# Patient Record
(demographics unavailable — no encounter records)

---

## 2025-03-15 NOTE — PROCEDURE
[Cervical Pap Smear] : cervical Pap smear [Liquid Base] : liquid base [Tolerated Well] : the patient tolerated the procedure well [No Complications] : there were no complications [Fibroid Uterus] : fibroid uterus [Transvaginal Ultrasound] : transvaginal ultrasound [FreeTextEntry4] : multiple small myomas  , endo 48 mm, nl ovaries , rto ov simple cyst , iud in place

## 2025-03-15 NOTE — DISCUSSION/SUMMARY
[FreeTextEntry1] : 46 yo p2 annual gyn, iud in , fibroids pap hpv sono - several small myomas, iud in  mammogram

## 2025-03-15 NOTE — HISTORY OF PRESENT ILLNESS
[Patient reported mammogram was normal] : Patient reported mammogram was normal [Patient reported PAP Smear was normal] : Patient reported PAP Smear was normal [N] : Patient reports normal menses [Y] : Positive pregnancy history [TextBox_4] : GYNHX history of fibroids, no cysts, or STDs 11/REG/4-5 iud 2022 [Mammogramdate] : 4-2024 [PapSmeardate] : 2-2024 [LMPDate] : 2- [MensesFreq] : 28 [MensesLength] : 4 [MensesAmount] : heavy to moderate [PGxTotal] : 6 [[facility]] : 2 [[facility] : 2 [PGxABSpont] : 3 [PGxEctopic] : 1 [FreeTextEntry1] :

## 2025-03-15 NOTE — HISTORY OF PRESENT ILLNESS
[Patient reported mammogram was normal] : Patient reported mammogram was normal [Patient reported PAP Smear was normal] : Patient reported PAP Smear was normal [N] : Patient reports normal menses [Y] : Positive pregnancy history [TextBox_4] : GYNHX history of fibroids, no cysts, or STDs 11/REG/4-5 iud 2022 [Mammogramdate] : 4-2024 [PapSmeardate] : 2-2024 [LMPDate] : 2- [MensesFreq] : 28 [MensesLength] : 4 [MensesAmount] : heavy to moderate [PGxTotal] : 6 [[facility] 2 [[facility] 2 [PGxABSpont] : 3 [PGxEctopic] : 1 [FreeTextEntry1] :

## 2025-03-15 NOTE — DISCUSSION/SUMMARY
[FreeTextEntry1] : 44 yo p2 annual gyn, iud in , fibroids pap hpv sono - several small myomas, iud in  mammogram